# Patient Record
Sex: FEMALE | Race: AMERICAN INDIAN OR ALASKA NATIVE
[De-identification: names, ages, dates, MRNs, and addresses within clinical notes are randomized per-mention and may not be internally consistent; named-entity substitution may affect disease eponyms.]

---

## 2018-07-04 NOTE — XRAY REPORT
FINAL REPORT



PROCEDURE:  XR RIBS UNI W PA CHEST 3+V RT



TECHNIQUE:  RIGHT rib radiographs, 3 views of the ribs, including

PA chest. 



HISTORY:  Right rib pain 



COMPARISON:  No prior studies are available for comparison.



FINDINGS:  

Heart: Normal.



Mediastinum/Vessels: Normal.



Lungs: Normal.



Pleural space: Normal.



Pneumothorax: None.



Bony thorax/ribs: No significant abnormality.



IMPRESSION:  

Normal Examination.

## 2018-07-04 NOTE — XRAY REPORT
FINAL REPORT



PROCEDURE:  XR SPINE LUMBOSACRAL 2-3V



TECHNIQUE:  Lumbar spine radiographs, including AP, lateral, and

lumbosacral spot views. CPT 09021 







HISTORY:  Lower Back Pain 



COMPARISON:  No prior studies are available for comparison.



FINDINGS:  

Alignment: There is mild degree spondylolisthesis at L4-5

measuring 8 millimeters resulting in mild degree spinal canal

stenosis.



Vertebral body heights/Disk spaces: Mild narrowing of the

intervertebral disc spaces noted at L5-S1..



Fracture(s): None.



Facets: Bilateral facet arthropathy is noted from L3-4 to L5-S1.



Bone mineralization: Normal.



IMPRESSION:  

Spondylolisthesis at L4-5 with spinal canal stenosis



Degenerative disc disease at L5-S1.

## 2018-07-04 NOTE — XRAY REPORT
FINAL REPORT



PROCEDURE:  XR SHOULDER 2+V RT



TECHNIQUE:  Right shoulder radiographs including AP views in

internal and external rotation and abduction. CPT 52893







HISTORY:  Right shoulder pain 



COMPARISON:  No prior studies are available for comparison.



FINDINGS:  

Fracture (s) and/or Dislocation(s): None .



Joint space(s): Normal.



Soft tissues: Normal .



Bone mineralization: Unremarkable.



Foreign bodies: None .







IMPRESSION:  

No acute abnormality

## 2018-10-22 ENCOUNTER — HOSPITAL ENCOUNTER (OUTPATIENT)
Dept: HOSPITAL 5 - MAMMO | Age: 48
Discharge: HOME | End: 2018-10-22
Attending: NURSE PRACTITIONER
Payer: MEDICARE

## 2018-10-22 DIAGNOSIS — Z12.31: Primary | ICD-10-CM

## 2018-10-22 PROCEDURE — 77067 SCR MAMMO BI INCL CAD: CPT

## 2018-10-22 NOTE — MAMMOGRAPHY REPORT
BILATERAL DIGITAL SCREENING MAMMOGRAM with CAD : 10/22/18 08:15:00



CLINICAL: Routine screening.



COMPARISON:None available.



FINDINGS: The breasts are heterogeneously dense, which may obscure 

small masses.Bilateral benign calcifications, some which are vascular. 

No mass, architectural distortion or suspicious calcifications.



IMPRESSION: No mammographic evidence of malignancy.



BI-RADS CATEGORY:  2 -- Benign



RECOMMENDATION: Routine mammographic screening in one year.



COMMENT:

Patient follow-up letters are generated by our [employer] application.

## 2019-08-21 ENCOUNTER — HOSPITAL ENCOUNTER (EMERGENCY)
Dept: HOSPITAL 5 - ED | Age: 49
Discharge: HOME | End: 2019-08-21
Payer: MEDICARE

## 2019-08-21 VITALS — DIASTOLIC BLOOD PRESSURE: 99 MMHG | SYSTOLIC BLOOD PRESSURE: 140 MMHG
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
## 2019-08-21 DIAGNOSIS — F31.9: ICD-10-CM

## 2019-08-21 DIAGNOSIS — S46.911A: Primary | ICD-10-CM

## 2019-08-21 DIAGNOSIS — Z88.7: ICD-10-CM

## 2019-08-21 DIAGNOSIS — Z98.84: ICD-10-CM

## 2019-08-21 DIAGNOSIS — F17.200: ICD-10-CM

## 2019-08-21 DIAGNOSIS — Z79.899: ICD-10-CM

## 2019-08-21 DIAGNOSIS — S16.1XXA: ICD-10-CM
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
## 2019-08-21 PROCEDURE — 72040 X-RAY EXAM NECK SPINE 2-3 VW: CPT

## 2019-08-21 PROCEDURE — 99283 EMERGENCY DEPT VISIT LOW MDM: CPT

## 2019-08-21 NOTE — XRAY REPORT
RIGHT SHOULDER, 3 VIEWS



INDICATION:  Right shoulder pain after fall. 



COMPARISON: 7/4/2018



IMPRESSION:  No acute osseous or soft tissue abnormality.    No significant DJD. No significant mena
e since the previous exam.



Signer Name: Olievr Aponte Jr, MD 

Signed: 8/21/2019 9:21 AM

 Workstation Name: [id number]

## 2019-08-21 NOTE — XRAY REPORT
CERVICAL SPINE, 4 VIEWS



INDICATION:  Neck pain after fall on stairs.



COMPARISON: None.



IMPRESSION:  Normal alignment.  Mild degenerative disc disease is identified at C3-4, C4-5 and C5-6. 
The remaining levels and facet joints are unremarkable.  No acute osseous or soft tissue abnormality.
    



Signer Name: Oliver Aponte Jr, MD 

Signed: 8/21/2019 9:19 AM

 Workstation Name: [id number]

## 2019-08-21 NOTE — EMERGENCY DEPARTMENT REPORT
ED General Adult HPI





- General


Chief complaint: Fall


Stated complaint: FALL/NECK PAIN


Time Seen by Provider: 08/21/19 08:12


Source: patient, EMS


Mode of arrival: Stretcher


Limitations: No Limitations





- History of Present Illness


Initial comments: 





The patient presents to the emergency department with a chief complaint of right

shoulder and neck pain.  Patient states she was walking down the steps in front 

of her symptom is the way the bottom of the steps and slipped on the tile floor 

and fell onto her shoulder which in turn  stretched her neck.  Patient denies 

loss of consciousness or hitting her head.


-: Sudden


Location: neck, upper extremity


Radiation: non-radiation


Severity scale (0 -10): 8


Quality: aching


Consistency: constant


Improves with: rest


Worsens with: movement


Associated Symptoms: denies other symptoms


Treatments Prior to Arrival: none





- Related Data


                                Home Medications











 Medication  Instructions  Recorded  Confirmed  Last Taken


 


Depakote 1,500 mg PO HS 07/04/18 07/04/18 Unknown








                                  Previous Rx's











 Medication  Instructions  Recorded  Last Taken  Type


 


Acetaminophen/Codeine [Tylenol 1 tab PO Q6H PRN #15 tab 08/21/19 Unknown Rx





/Codeine # 3 tab]    


 


Ondansetron [Zofran Odt] 4 mg PO Q4HR PRN #20 tab.rapdis 08/21/19 Unknown Rx











                                    Allergies











Allergy/AdvReac Type Severity Reaction Status Date / Time


 


NSAIDS (Non-Steroidal AdvReac  Unknown Verified 07/18/18 07:24





Anti-Inflamma     














ED Review of Systems


ROS: 


Stated complaint: FALL/NECK PAIN


Other details as noted in HPI





Comment: All other systems reviewed and negative


Constitutional: denies: chills, fever


Eyes: denies: eye pain, eye discharge, vision change


ENT: denies: ear pain, throat pain


Respiratory: denies: cough, shortness of breath, wheezing


Cardiovascular: denies: chest pain, palpitations


Endocrine: no symptoms reported


Gastrointestinal: denies: abdominal pain, nausea, diarrhea


Genitourinary: denies: urgency, dysuria, discharge


Musculoskeletal: denies: back pain, joint swelling, arthralgia


Skin: denies: rash, lesions


Neurological: denies: headache, weakness, paresthesias


Psychiatric: denies: anxiety, depression


Hematological/Lymphatic: denies: easy bleeding, easy bruising





ED Past Medical Hx





- Past Medical History


Hx Psychiatric Treatment: Yes (Bipolar)





- Surgical History


Additional Surgical History: Bariactric Surgery 2015





- Social History


Smoking Status: Current Every Day Smoker





- Medications


Home Medications: 


                                Home Medications











 Medication  Instructions  Recorded  Confirmed  Last Taken  Type


 


Depakote 1,500 mg PO HS 07/04/18 07/04/18 Unknown History


 


Acetaminophen/Codeine [Tylenol 1 tab PO Q6H PRN #15 tab 08/21/19  Unknown Rx





/Codeine # 3 tab]     


 


Ondansetron [Zofran Odt] 4 mg PO Q4HR PRN #20 tab.rapdis 08/21/19  Unknown Rx














ED Physical Exam





- General


Limitations: No Limitations


General appearance: alert, in no apparent distress





- Head


Head exam: Present: atraumatic, normocephalic





- Eye


Eye exam: Present: normal appearance, PERRL, EOMI





- ENT


ENT exam: Present: mucous membranes moist





- Neck


Neck exam: Present: other (paracervical ttp no midline ttp)





- Respiratory


Respiratory exam: Present: normal lung sounds bilaterally.  Absent: respiratory 

distress





- Cardiovascular


Cardiovascular Exam: Present: regular rate, normal rhythm.  Absent: systolic 

murmur, diastolic murmur, rubs, gallop





- GI/Abdominal


GI/Abdominal exam: Present: soft, normal bowel sounds.  Absent: distended, 

tenderness





- Extremities Exam


Extremities exam: Present: other (right shoulder is tender to palpation at the 

acromioclavicular junction with limited range of motion secondary to pain)





- Back Exam


Back exam: Present: normal inspection





- Neurological Exam


Neurological exam: Present: alert, oriented X3, CN II-XII intact.  Absent: motor

sensory deficit





- Psychiatric


Psychiatric exam: Present: normal affect, normal mood





- Skin


Skin exam: Present: warm, dry, intact, normal color.  Absent: rash





ED Course


                                   Vital Signs











  08/21/19 08/21/19 08/21/19





  07:58 08:14 08:16


 


Temperature   98.6 F


 


Pulse Rate 92 H  66


 


Respiratory 16 18 16





Rate   


 


Blood Pressure 160/70  131/95





[Left]   


 


O2 Sat by Pulse 98 97 97





Oximetry   














ED Medical Decision Making





- Radiology Data


Radiology results: report reviewed





- Medical Decision Making





Discussed results with patient 


Critical care attestation.: 


If time is entered above; I have spent that time in minutes in the direct care 

of this critically ill patient, excluding procedure time.








ED Disposition


Clinical Impression: 


 Right shoulder strain, Cervical muscle strain, Cervical arthritis





Disposition: DC-01 TO HOME OR SELFCARE


Is pt being admited?: No


Does the pt Need Aspirin: No


Condition: Stable


Instructions:  Muscle Strain (ED), Cervical Spine Strain (ED), Shoulder Sprain 

(ED)


Additional Instructions: 


return if worse


Referrals: 


[facility],MD [Primary Care Provider] - 3-5 Days


[facility] [Provider Group] - 3-5 Days


[facility] [Provider Group] - 3-5 Days


Time of Disposition: 09:36

## 2019-09-28 ENCOUNTER — HOSPITAL ENCOUNTER (EMERGENCY)
Dept: HOSPITAL 5 - ED | Age: 49
Discharge: HOME | End: 2019-09-28
Payer: MEDICARE

## 2019-09-28 VITALS — DIASTOLIC BLOOD PRESSURE: 78 MMHG | SYSTOLIC BLOOD PRESSURE: 145 MMHG
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
## 2019-09-28 DIAGNOSIS — Z88.8: ICD-10-CM
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
## 2019-09-28 DIAGNOSIS — F17.200: ICD-10-CM
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
## 2019-09-28 DIAGNOSIS — S83.92XA: Primary | ICD-10-CM

## 2019-09-28 DIAGNOSIS — M13.862: ICD-10-CM

## 2019-09-28 NOTE — EMERGENCY DEPARTMENT REPORT
ED Lower Extremity HPI





- General


Chief Complaint: Extremity Injury, Lower


Stated Complaint: LEFT KNEE PAIN


Time Seen by Provider: 09/28/19 03:06


Source: patient


Mode of arrival: Ambulatory


Limitations: No Limitations





- History of Present Illness


Initial Comments: 





Patient is a 49-year-old female who presents to emergency room with complaints 

of left knee pain began around 8 PM last night.  Patient states that she hit her

knee against the cab door when getting inside.  She states she has not been 

ambulatory.  Patient has never hurt this knee before.  He denies any numbness or

weakness or any other injury.  Patient denies any past medical history.  Patient

states that she had bariatric surgery and was advised not to use NSAIDs 

regularly. 





- Related Data


                                  Previous Rx's











 Medication  Instructions  Recorded  Last Taken  Type


 


Acetaminophen/Codeine [Tylenol 1 tab PO Q6H PRN #7 tab 09/28/19 Unknown Rx





/Codeine # 3 tab]    


 


Meloxicam [Mobic] 7.5 mg PO QDAY PRN #7 tablet 09/28/19 Unknown Rx











                                    Allergies











Allergy/AdvReac Type Severity Reaction Status Date / Time


 


NSAIDS (Non-Steroidal Allergy  Unknown Verified 09/28/19 02:33





Anti-Inflamma     














ED Review of Systems


ROS: 


Stated complaint: LEFT KNEE PAIN


Other details as noted in HPI





Comment: All other systems reviewed and negative





ED Past Medical Hx





- Past Medical History


Previous Medical History?: No





- Surgical History


Past Surgical History?: Yes


Additional Surgical History: barriatric X 4 years





- Social History


Smoking Status: Current Every Day Smoker


Substance Use Type: None





- Medications


Home Medications: 


                                Home Medications











 Medication  Instructions  Recorded  Confirmed  Last Taken  Type


 


Acetaminophen/Codeine [Tylenol 1 tab PO Q6H PRN #7 tab 09/28/19  Unknown Rx





/Codeine # 3 tab]     


 


Meloxicam [Mobic] 7.5 mg PO QDAY PRN #7 tablet 09/28/19  Unknown Rx














ED Physical Exam





- General


Limitations: No Limitations


General appearance: alert, in no apparent distress





- Head


Head exam: Present: atraumatic, normocephalic





- Eye


Eye exam: Present: normal appearance





- ENT


ENT exam: Present: mucous membranes moist





- Extremities Exam


Extremities exam: Present: other (TTP of the left anterior knee with edema 

present, pt will not perform ROM secondary to pain, pt will not allow assessment

of ligaments, 2+ distal pulses, sensation intact)





- Neurological Exam


Neurological exam: Present: alert, oriented X3





- Psychiatric


Psychiatric exam: Present: normal affect, normal mood





- Skin


Skin exam: Present: warm, dry, intact





ED Course


                                   Vital Signs











  09/28/19 09/28/19





  02:38 05:22


 


Temperature 98.2 F 


 


Pulse Rate 114 H 90


 


Respiratory 20 16





Rate  


 


Blood Pressure 143/91 


 


Blood Pressure  145/78





[Right]  


 


O2 Sat by Pulse 97 99





Oximetry  














ED Lower Extremity MDM





- Radiology Data


Radiology results: report reviewed





LEFT KNEE 3 VIEW(S)





INDICATION / CLINICAL INFORMATION:


left knee pain





COMPARISON:


None available.





FINDINGS:





BONES / JOINT(S): No acute fracture or subluxation. Moderately advanced 

tricompartment degenerative


arthrosis. Small moderate left knee joint effusion.





SOFT TISSUES: Mild to moderate circumferential soft tissue swelling.





ADDITIONAL FINDINGS: None.











Signer Name: CHARU Bianchi MD


Signed: 9/28/2019 4:10 AM


Workstation Name: [id number]








Transcribed By: DT


Dictated By: Parrish Bianchi MD


Electronically Authenticated By: Parrish Bianchi MD


Signed Date/Time: 09/28/19 0410





- Medical Decision Making





Patient is a 49-year-old female who presents to emergency room with complaints 

of left knee pain began around 8 PM last night.  Patient states that she hit her

 knee against the cab door when getting inside.  She states she has not been 

ambulatory.  Patient has never hurt this knee before.  He denies any numbness or

 weakness or any other injury.  Patient denies any past medical history.  

Patient states that she had bariatric surgery and was advised not to use NSAIDs 

regularly. initial vitals with elevated HR which improved after pain medication 

administration. on exam: TTP of the left anterior knee with edema present, pt 

will not perform ROM secondary to pain, pt will not allow assessment of 

ligaments, 2+ distal pulses, sensation intact. XR of the left knee: No acute 

fracture or subluxation. Moderately advanced tricompartment degenerative 

arthrosis. Small moderate left knee joint effusion.SOFT TISSUES: Mild to 

moderate circumferential soft tissue swelling. due to inability to fully assess 

pts ROM and ligament stability pt was placed in knee immobilizer and given 

crutches and crutch training and discussed to see an orthopedic in the next 2-3 

days for full examination. advised pt to please take medication as prescribed.  

Do not drive or operate heavy machinery while taking the medication.  may use 

ice, elevation of the leg, rest. please follow up with Dr. Liu, orthopedic in

 the next 2-3 days. Return to the emergency room for any new or worsening 

symptoms.





- Differential Diagnosis


strain, sprain, fx, dislocation, tendon/ligament injury


Critical care attestation.: 


If time is entered above; I have spent that time in minutes in the direct care 

of this critically ill patient, excluding procedure time.








ED Disposition


Clinical Impression: 


 Arthritis, Knee effusion, left





Left knee pain


Qualifiers:


 Chronicity: acute Qualified Code(s): M25.562 - Pain in left knee





Left knee sprain


Qualifiers:


 Encounter type: initial encounter Involved ligament of knee: unspecified 

ligament Qualified Code(s): S83.92XA - Sprain of unspecified site of left knee, 

initial encounter





Disposition: DC-01 TO HOME OR SELFCARE


Is pt being admited?: No


Does the pt Need Aspirin: No


Condition: Stable


Instructions:  Knee Sprain (ED), Knee Effusion (ED), RICE Therapy (ED)


Additional Instructions: 


Please take medication as prescribed.  Do not drive or operate heavy machinery 

while taking the medication.  may use ice, elevation of the leg, rest. please 

follow up with Dr. Liu, orthopedic in the next 2-3 days. Return to the 

emergency room for any new or worsening symptoms.


Prescriptions: 


Meloxicam [Mobic] 7.5 mg PO QDAY PRN #7 tablet


 PRN Reason: Pain, Moderate (4-6)


Acetaminophen/Codeine [Tylenol /Codeine # 3 tab] 1 tab PO Q6H PRN #7 tab


 PRN Reason: Pain , Severe (7-10)


Referrals: 


CHELSEA LIU MD [Staff Physician] - 3-5 Days


Time of Disposition: 04:25


Print Language: ENGLISH

## 2019-09-28 NOTE — XRAY REPORT
LEFT KNEE 3 VIEW(S)



INDICATION / CLINICAL INFORMATION:

left knee pain



COMPARISON:

None available.

 

FINDINGS:



BONES / JOINT(S): No acute fracture or subluxation. Moderately advanced tricompartment degenerative a
rthrosis. Small moderate left knee joint effusion.



SOFT TISSUES: Mild to moderate circumferential soft tissue swelling.



ADDITIONAL FINDINGS: None.







Signer Name: CHARU Bianchi MD 

Signed: 9/28/2019 4:10 AM

 Workstation Name: [id number]

## 2019-10-05 ENCOUNTER — HOSPITAL ENCOUNTER (EMERGENCY)
Dept: HOSPITAL 5 - ED | Age: 49
Discharge: HOME | End: 2019-10-05
Payer: MEDICARE

## 2019-10-05 VITALS — SYSTOLIC BLOOD PRESSURE: 130 MMHG | DIASTOLIC BLOOD PRESSURE: 97 MMHG
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
## 2019-10-05 DIAGNOSIS — S83.92XD: Primary | ICD-10-CM

## 2019-10-05 DIAGNOSIS — F17.200: ICD-10-CM

## 2019-10-05 DIAGNOSIS — F31.9: ICD-10-CM

## 2019-10-05 PROCEDURE — 99281 EMR DPT VST MAYX REQ PHY/QHP: CPT

## 2019-10-05 NOTE — EVENT NOTE
ED Screening Note


Date of service: 10/05/19


Time: 13:29


ED Screening Note: 


Seen here last friday night and injured left . Came and had xray and was told 

she had sprained knee and brace placed. She called Noe and was told that he 

does not take well care. She finally found [place] [facility] on 

10/14/2019 at 10 pm at [facility]. She was given tylenol 3 and another 

pill with crutches





Still with brace to knee and using crutches. Pain 7/10





This initial assessment/diagnostic orders/clinical plan/treatment(s) is/are 

subject to change based on patients health status, clinical progression and re-

assessment by fellow clinical providers in the ED. Further treatment and workup 

at subsequent clinical providers discretion. Patient/guardian urged not to elope

from the ED as their condition may be serious if not clinically assessed and 

managed. 





Initial orders include: TBS by FT provider

## 2019-10-05 NOTE — EMERGENCY DEPARTMENT REPORT
ED Lower Extremity HPI





- General


Chief Complaint: Extremity Injury, Lower


Stated Complaint: LFT KNEE PAIN


Time Seen by Provider: 10/05/19 13:24


Source: patient


Mode of arrival: Ambulatory


Limitations: No Limitations





- History of Present Illness


Initial Comments: 





Recent dx knee sprain


not to see ortho until next week and needs more pain meds


MD Complaint: knee injury


-: Sudden, week(s) (1)


Injury: Knee: Left


Type of Injury: unknown


Place: home


Improves With: rest


Worsens With: movement





- Related Data


                                Home Medications











 Medication  Instructions  Recorded  Confirmed  Last Taken


 


Depakote 1,500 mg PO HS 07/04/18 07/04/18 Unknown








                                  Previous Rx's











 Medication  Instructions  Recorded  Last Taken  Type


 


Ondansetron [Zofran Odt] 4 mg PO Q4HR PRN #20 tab.rapdis 08/21/19 Unknown Rx


 


Acetaminophen/Codeine [Tylenol 1 tab PO Q6H PRN #12 tab 10/05/19 Unknown Rx





/Codeine # 3 tab]    











                                    Allergies











Allergy/AdvReac Type Severity Reaction Status Date / Time


 


NSAIDS (Non-Steroidal AdvReac  Unknown Verified 07/18/18 07:24





Anti-Inflamma     














ED Review of Systems


ROS: 


Stated complaint: LFT KNEE PAIN


Other details as noted in HPI





Comment: All other systems reviewed and negative





ED Past Medical Hx





- Past Medical History


Hx Psychiatric Treatment: Yes (Bipolar)





- Surgical History


Past Surgical History?: No


Additional Surgical History: Bariactric Surgery 2015





- Social History


Smoking Status: Current Every Day Smoker


Substance Use Type: Alcohol





- Medications


Home Medications: 


                                Home Medications











 Medication  Instructions  Recorded  Confirmed  Last Taken  Type


 


Depakote 1,500 mg PO HS 07/04/18 07/04/18 Unknown History


 


Ondansetron [Zofran Odt] 4 mg PO Q4HR PRN #20 tab.rapdis 08/21/19  Unknown Rx


 


Acetaminophen/Codeine [Tylenol 1 tab PO Q6H PRN #12 tab 10/05/19  Unknown Rx





/Codeine # 3 tab]     














ED Physical Exam





- General


Limitations: No Limitations


General appearance: alert, in no apparent distress





- Head


Head exam: Present: atraumatic, normocephalic





- Eye


Eye exam: Present: normal appearance, EOMI





- Neck


Neck exam: Present: normal inspection, full ROM





- Extremities Exam


Extremities exam: Present: other (immobilizer to L knee, mild swelling to knee, 

no calf pain or edema, normal pulses distally )





- Neurological Exam


Neurological exam: Present: alert, oriented X3





- Psychiatric


Psychiatric exam: Present: normal affect, normal mood





- Skin


Skin exam: Present: warm, dry, normal color





ED Course





                                   Vital Signs











  10/05/19





  13:20


 


Temperature 98.1 F


 


Pulse Rate 88


 


Respiratory 18





Rate 


 


Blood Pressure 130/97





[Left] 


 


O2 Sat by Pulse 99





Oximetry 














ED Lower Extremity MDM





- Medical Decision Making





will give few pain pills until f/u





- Differential Diagnosis


sprain, med refill


Critical care attestation.: 


If time is entered above; I have spent that time in minutes in the direct care 

of this critically ill patient, excluding procedure time.








ED Disposition


Clinical Impression: 


Left knee sprain


Qualifiers:


 Encounter type: subsequent encounter Involved ligament of knee: unspecified 

ligament Qualified Code(s): S83.92XD - Sprain of unspecified site of left knee, 

subsequent encounter





Disposition: DC-01 TO HOME OR SELFCARE


Is pt being admited?: No


Condition: Good


Instructions:  Knee Sprain (ED)


Additional Instructions: 


Keep your orthopedic follow up. 


Prescriptions: 


Acetaminophen/Codeine [Tylenol /Codeine # 3 tab] 1 tab PO Q6H PRN #12 tab


 PRN Reason: pain


Time of Disposition: 14:56

## 2019-10-25 ENCOUNTER — HOSPITAL ENCOUNTER (EMERGENCY)
Dept: HOSPITAL 5 - ED | Age: 49
Discharge: HOME | End: 2019-10-25
Payer: MEDICARE

## 2019-10-25 VITALS — SYSTOLIC BLOOD PRESSURE: 115 MMHG | DIASTOLIC BLOOD PRESSURE: 76 MMHG

## 2019-10-25 DIAGNOSIS — F31.9: ICD-10-CM

## 2019-10-25 DIAGNOSIS — M54.16: Primary | ICD-10-CM

## 2019-10-25 DIAGNOSIS — F17.200: ICD-10-CM

## 2019-10-25 DIAGNOSIS — Z79.899: ICD-10-CM

## 2019-10-25 DIAGNOSIS — M25.562: ICD-10-CM

## 2019-10-25 DIAGNOSIS — Z88.7: ICD-10-CM

## 2019-10-25 PROCEDURE — 96372 THER/PROPH/DIAG INJ SC/IM: CPT

## 2019-10-25 PROCEDURE — 99282 EMERGENCY DEPT VISIT SF MDM: CPT

## 2019-10-25 NOTE — EMERGENCY DEPARTMENT REPORT
ED Extremity Problem HPI





- General


Chief complaint: Extremity Injury, Lower


Stated complaint: LFT KNEE SPRAINED/RT LEG PAIN


Time Seen by Provider: 10/25/19 08:14


Source: patient


Mode of arrival: Ambulatory


Limitations: No Limitations





- History of Present Illness


Initial comments: 





Patient is a 49-year-old American female in no snake and past mental history 

states that approximately 3 weeks ago she tripped and sprained her left knee.  

Left knee is improving however over the last week she is developed some right 

lower back pain is radiating into the right buttock and down through the right 

lower extremity.  The patient denies any bowel or bladder dysfunction.  She 

denies any new direct trauma.  Patient is having difficulty walking secondary to

pain.  Pain is 8 out of 10 in severity is just uncomfortable feeling.  Patient 

states she cannot sit up straight because of the pain.


Associated Symptoms: denies: chest pain, shortness of breath, fever, myalgias, 

arthralgias





- Related Data


                                Home Medications











 Medication  Instructions  Recorded  Confirmed  Last Taken


 


Depakote 1,500 mg PO HS 07/04/18 07/04/18 Unknown








                                  Previous Rx's











 Medication  Instructions  Recorded  Last Taken  Type


 


Ondansetron [Zofran Odt] 4 mg PO Q4HR PRN #20 tab.rapdis 08/21/19 Unknown Rx


 


Acetaminophen/Codeine [Tylenol 1 tab PO Q6H PRN #12 tab 10/05/19 Unknown Rx





/Codeine # 3 tab]    


 


methOCARBAMOL [Robaxin TAB] 500 mg PO Q6H PRN #14 tablet 10/25/19 Unknown Rx


 


traMADol [Ultram] 50 mg PO Q6HR PRN #12 tablet 10/25/19 Unknown Rx











                                    Allergies











Allergy/AdvReac Type Severity Reaction Status Date / Time


 


NSAIDS (Non-Steroidal AdvReac  Unknown Verified 07/18/18 07:24





Anti-Inflamma     














ED Review of Systems


ROS: 


Stated complaint: LFT KNEE SPRAINED/RT LEG PAIN


Other details as noted in HPI





Comment: All other systems reviewed and negative





ED Past Medical Hx





- Past Medical History


Previous Medical History?: Yes


Hx Psychiatric Treatment: Yes (Bipolar)





- Surgical History


Past Surgical History?: Yes


Additional Surgical History: Bariactric Surgery 2015





- Social History


Smoking Status: Current Every Day Smoker


Substance Use Type: None





- Medications


Home Medications: 


                                Home Medications











 Medication  Instructions  Recorded  Confirmed  Last Taken  Type


 


Depakote 1,500 mg PO HS 07/04/18 07/04/18 Unknown History


 


Ondansetron [Zofran Odt] 4 mg PO Q4HR PRN #20 tab.rapdis 08/21/19  Unknown Rx


 


Acetaminophen/Codeine [Tylenol 1 tab PO Q6H PRN #12 tab 10/05/19  Unknown Rx





/Codeine # 3 tab]     


 


methOCARBAMOL [Robaxin TAB] 500 mg PO Q6H PRN #14 tablet 10/25/19  Unknown Rx


 


traMADol [Ultram] 50 mg PO Q6HR PRN #12 tablet 10/25/19  Unknown Rx














ED Physical Exam





- General


Limitations: No Limitations


General appearance: alert, in no apparent distress





- Head


Head exam: Present: atraumatic, normocephalic





- Eye


Eye exam: Present: normal appearance, PERRL, EOMI





- ENT


ENT exam: Present: mucous membranes moist





- Neck


Neck exam: Present: normal inspection





- Respiratory


Respiratory exam: Present: normal lung sounds bilaterally.  Absent: respiratory 

distress, wheezes, rales, rhonchi





- Cardiovascular


Cardiovascular Exam: Present: regular rate, normal rhythm, normal heart sounds





- GI/Abdominal


GI/Abdominal exam: Present: soft, normal bowel sounds.  Absent: distended





- Extremities Exam


Extremities exam: Present: normal inspection





- Back Exam


Back exam: Present: normal inspection, paraspinal tenderness (right lumbar. pain

 with palpation of buttock)





- Neurological Exam


Neurological exam: Present: alert, oriented X3





- Psychiatric


Psychiatric exam: Present: normal affect, normal mood





- Skin


Skin exam: Present: warm, dry, intact, normal color.  Absent: rash





ED Course





                                   Vital Signs











  10/25/19





  08:06


 


Temperature 98.6 F


 


Pulse Rate 92 H


 


Respiratory 18





Rate 


 


Blood Pressure 115/76


 


O2 Sat by Pulse 98





Oximetry 














ED Medical Decision Making





- Medical Decision Making





Patient's pain is consistent with acute lumbar radiculopathy.  Patient like was 

favoring her left knee has developed issue on her right lower back.  Patient be 

referred to orthopedic surgery the patient be started on steroid therapy and 

pain management.


Critical care attestation.: 


If time is entered above; I have spent that time in minutes in the direct care 

of this critically ill patient, excluding procedure time.








ED Disposition


Clinical Impression: 


 Lumbar radiculopathy, acute





Disposition: DC-01 TO HOME OR SELFCARE


Is pt being admited?: No


Does the pt Need Aspirin: No


Condition: Stable


Instructions:  Lumbar Radiculopathy (ED)


Referrals: 


CHELSEA SAEZ MD [Staff Physician] - 3-5 Days


Time of Disposition: 08:21

## 2020-02-18 ENCOUNTER — HOSPITAL ENCOUNTER (OUTPATIENT)
Dept: HOSPITAL 5 - MAMMO | Age: 50
Discharge: HOME | End: 2020-02-18
Attending: INTERNAL MEDICINE
Payer: MEDICARE

## 2020-02-18 DIAGNOSIS — Z12.31: Primary | ICD-10-CM

## 2020-02-18 PROCEDURE — 77067 SCR MAMMO BI INCL CAD: CPT

## 2020-02-18 NOTE — MAMMOGRAPHY REPORT
DIGITAL SCREENING MAMMOGRAM WITH CAD, 2/18/2020



INDICATION: Routine screening mammography. 



TECHNIQUE:  Digital bilateral  2D mammography was obtained in the craniocaudal and mediolateral obliq
ue projections. This examination was interpreted with the benefit of Computer-Aided Detection analysi
s.



COMPARISON: 10/22/2018



FINDINGS: 



Breast Density: The breasts are heterogeneously dense, which may obscure small masses.



Left asymmetries on both views require additional imaging. No architectural distortion or suspicious 
calcifications. There is no evidence of dominant mass, suspicious calcifications or architectural dis
tortion in the right breast.



IMPRESSION: Left asymmetries requiring additional imaging. Recommend recall for left lateral, repeat 
MLO and spot compression MLO and CC views and left breast ultrasound if needed.



Follow up recommendation: Special View: Spot



Category 0: Incomplete. Needs additional imaging evaluation and/or prior mammograms for comparison.



A "normal" or negative report should not discourage follow up or biopsy of a clinically significant f
inding.



A written summary of these findings will be mailed to the patient. The patient will be entered into a
 mammography reporting system which will generate a reminder letter for the patient's next appointmen
t at the appropriate interval.



The [employer] recommends yearly mammograms starting at age 40 and continuing as l
cristhian as a woman is in good health.  Breast MRI is recommended for women with an approximate 20-25% or 
greater lifetime risk of breast cancer, including women with a strong family history of breast or ova
lisseth cancer or who have been treated for Hodgkin's disease.



Signer Name: Jesus Ralph MD 

Signed: 2/18/2020 3:12 PM

 Workstation Name: [id number]

## 2020-03-03 ENCOUNTER — HOSPITAL ENCOUNTER (EMERGENCY)
Dept: HOSPITAL 5 - ED | Age: 50
LOS: 1 days | Discharge: HOME | End: 2020-03-04
Payer: MEDICARE

## 2020-03-03 DIAGNOSIS — Z79.899: ICD-10-CM

## 2020-03-03 DIAGNOSIS — S29.012A: Primary | ICD-10-CM

## 2020-03-03 DIAGNOSIS — F31.9: ICD-10-CM

## 2020-03-03 DIAGNOSIS — F17.200: ICD-10-CM
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
## 2020-03-03 DIAGNOSIS — J18.9: ICD-10-CM

## 2020-03-03 DIAGNOSIS — Z98.890: ICD-10-CM
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
## 2020-03-03 DIAGNOSIS — Z88.6: ICD-10-CM
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
## 2020-03-03 LAB
ALBUMIN SERPL-MCNC: 3.3 G/DL (ref 3.9–5)
ALT SERPL-CCNC: 7 UNITS/L (ref 7–56)
APTT BLD: 32.8 SEC. (ref 24.2–36.6)
BUN SERPL-MCNC: 6 MG/DL (ref 7–17)
BUN/CREAT SERPL: 15 %
CALCIUM SERPL-MCNC: 8.6 MG/DL (ref 8.4–10.2)
HCT VFR BLD CALC: 34.6 % (ref 30.3–42.9)
HEMOLYSIS INDEX: 2
HGB BLD-MCNC: 11.8 GM/DL (ref 10.1–14.3)
INR PPP: 0.89 (ref 0.87–1.13)
MCHC RBC AUTO-ENTMCNC: 34 % (ref 30–34)
MCV RBC AUTO: 99 FL (ref 79–97)
PLATELET # BLD: 442 K/MM3 (ref 140–440)
RBC # BLD AUTO: 3.49 M/MM3 (ref 3.65–5.03)

## 2020-03-03 PROCEDURE — 96365 THER/PROPH/DIAG IV INF INIT: CPT

## 2020-03-03 PROCEDURE — 96375 TX/PRO/DX INJ NEW DRUG ADDON: CPT

## 2020-03-03 PROCEDURE — 85025 COMPLETE CBC W/AUTO DIFF WBC: CPT

## 2020-03-03 PROCEDURE — 36415 COLL VENOUS BLD VENIPUNCTURE: CPT

## 2020-03-03 PROCEDURE — 80053 COMPREHEN METABOLIC PANEL: CPT

## 2020-03-03 PROCEDURE — 93010 ELECTROCARDIOGRAM REPORT: CPT

## 2020-03-03 PROCEDURE — 93005 ELECTROCARDIOGRAM TRACING: CPT

## 2020-03-03 PROCEDURE — 85610 PROTHROMBIN TIME: CPT

## 2020-03-03 PROCEDURE — 85007 BL SMEAR W/DIFF WBC COUNT: CPT

## 2020-03-03 PROCEDURE — 71046 X-RAY EXAM CHEST 2 VIEWS: CPT

## 2020-03-03 PROCEDURE — 99285 EMERGENCY DEPT VISIT HI MDM: CPT

## 2020-03-03 PROCEDURE — 85730 THROMBOPLASTIN TIME PARTIAL: CPT

## 2020-03-03 PROCEDURE — 84484 ASSAY OF TROPONIN QUANT: CPT

## 2020-03-03 NOTE — EMERGENCY DEPARTMENT REPORT
Blank Doc





- Documentation


Documentation: 





50-year-old female that presents with chest pain, SOB and cough. 





This initial assessment/diagnostic orders/clinical plan/treatment(s) is/are 

subject to change based on patient's health status, clinical progression and re-

assessment by fellow clinical providers in the ED.  Further treatment and workup

at subsequent clinical providers discretion.  Patient/guardians urged not to 

elope from the ED as their condition may be serious if not clinically assessed 

and managed.  Initial orders include:


1- Patient sent to [facility] for further evaluation and treatment


2- cardiac work-up

## 2020-03-03 NOTE — XRAY REPORT
CHEST 2 VIEWS 



INDICATION / CLINICAL INFORMATION:

Chest Pain.



COMPARISON: 

None available.



FINDINGS:



SUPPORT DEVICES: None.

HEART / MEDIASTINUM: No significant abnormality. 

LUNGS / PLEURA: There is mild airspace opacity in the left lung base which could represent atelectasi
s or pneumonia. The remainder the lungs are clear. .No pneumothorax. 



ADDITIONAL FINDINGS: No significant additional findings.



IMPRESSION:

1. There is mild airspace opacity in the left base which could represent atelectasis or pneumonia.



Signer Name: Nick Aguayo MD 

Signed: 3/3/2020 10:35 PM

Workstation Name: [id number]

## 2020-03-03 NOTE — EMERGENCY DEPARTMENT REPORT
ED General Adult HPI





- General


Chief complaint: Chest Pain


Stated complaint: CHEST/BACK PAIN


Time Seen by Provider: 03/03/20 21:48


Source: patient, EMS


Mode of arrival: Wheelchair


Limitations: No Limitations





- History of Present Illness


Initial comments: 





Patient is a 50-year-old female that presents emergency room with complaints of 

chest pain, back pain and cough.  Patient states her cough is been going on for 

a week.  Patient states that her chest pain and back pain have been going on for

1 day.  Patient complains of bilateral chest pain.  Patient states the chest 

pain is better with rest and worse with coughing and movement.  Patient states 

the chest pain is a 9 out of 10.  Patient states she is also having upper back 

pain.  Patient states it is in both sides of her back.  Patient states the chest

pain is not radiating and they are separate.  Patient states that the back pain 

is a 9 out of 10.  Patient states the back pain is better with rest and worse 

with coughing and movement.  Patient states her chest and back pain also worse 

with palpation.  Patient also complains of shortness of breath with coughing.  

Patient denies past medical history except for bipolar disorder.  Patient denies

nausea vomiting.  Patient denies fever and chills.


-: Sudden


Location: chest, back


Radiation: non-radiation


Severity scale (0 -10): 9


Consistency: constant


Improves with: rest


Worsens with: movement


Associated Symptoms: chest pain, cough, shortness of breath.  denies: confusion,

diaphoresis, fever/chills, headaches, loss of appetite, malaise, n

ausea/vomiting, rash, seizure, syncope, weakness


Treatments Prior to Arrival: none





- Related Data


                                  Previous Rx's











 Medication  Instructions  Recorded  Last Taken  Type


 


Ondansetron [Zofran Odt] 4 mg PO Q4HR PRN #20 tab.rapdis 08/21/19 Unknown Rx


 


Acetaminophen/Codeine [Tylenol 1 tab PO Q6H PRN #12 tab 10/05/19 Unknown Rx





/Codeine # 3 tab]    


 


methOCARBAMOL [Robaxin TAB] 500 mg PO Q6H PRN #14 tablet 10/25/19 Unknown Rx


 


traMADoL [Ultram] 50 mg PO Q6HR PRN #12 tablet 10/25/19 Unknown Rx


 


Buspirone HCl [busPIRone] 20 mg PO DAILY 30 Days #30 03/04/20 Unknown Rx


 


DOXYCYCLINE Hyclate [Vibramycin 100 mg PO Q12HR 10 Days #20 capsule 03/04/20 

Unknown Rx





CAP]    


 


Depakote 1,500 mg PO HS 30 Days #30 03/04/20 Unknown Rx


 


guaiFENesin/CODEINE [Robitussin AC] 5 ml PO Q4HR PRN #100 ml 03/04/20 Unknown Rx


 


methylPREDNISolone [Medrol 4MG 4 mg PO DAILY 6 Days #1 tab.ds.pk 03/04/20 

Unknown Rx





DOSEPAK (21 tabs)]    











                                    Allergies











Allergy/AdvReac Type Severity Reaction Status Date / Time


 


NSAIDS (Non-Steroidal AdvReac  Unknown Verified 07/18/18 07:24





Anti-Inflamma     














ED Review of Systems


ROS: 


Stated complaint: CHEST/BACK PAIN


Other details as noted in HPI





Constitutional: denies: chills, fever


Eyes: denies: eye pain, eye discharge, vision change


ENT: denies: ear pain, throat pain


Respiratory: cough, shortness of breath, SOB with exertion.  denies: SOB at 

rest, wheezing


Cardiovascular: chest pain.  denies: palpitations


Endocrine: no symptoms reported


Gastrointestinal: denies: abdominal pain, nausea, diarrhea


Genitourinary: denies: urgency, dysuria, discharge


Musculoskeletal: back pain.  denies: joint swelling, arthralgia


Skin: denies: rash, lesions


Neurological: denies: headache, weakness, paresthesias


Psychiatric: denies: anxiety, depression


Hematological/Lymphatic: denies: easy bleeding, easy bruising





ED Past Medical Hx





- Past Medical History


Previous Medical History?: Yes


Hx Psychiatric Treatment: Yes (Bipolar)





- Surgical History


Past Surgical History?: Yes


Additional Surgical History: Bariatric Surgery 2015





- Family History


Family history: no significant





- Social History


Smoking Status: Current Every Day Smoker


Substance Use Type: None





- Medications


Home Medications: 


                                Home Medications











 Medication  Instructions  Recorded  Confirmed  Last Taken  Type


 


Ondansetron [Zofran Odt] 4 mg PO Q4HR PRN #20 tab.rapdis 08/21/19 03/04/20 

Unknown Rx


 


Acetaminophen/Codeine [Tylenol 1 tab PO Q6H PRN #12 tab 10/05/19 03/04/20 

Unknown Rx





/Codeine # 3 tab]     


 


methOCARBAMOL [Robaxin TAB] 500 mg PO Q6H PRN #14 tablet 10/25/19 03/04/20 

Unknown Rx


 


traMADoL [Ultram] 50 mg PO Q6HR PRN #12 tablet 10/25/19 03/04/20 Unknown Rx


 


Buspirone HCl [busPIRone] 20 mg PO DAILY 30 Days #30 03/04/20  Unknown Rx


 


DOXYCYCLINE Hyclate [Vibramycin 100 mg PO Q12HR 10 Days #20 capsule 03/04/20  

Unknown Rx





CAP]     


 


Depakote 1,500 mg PO HS 30 Days #30 03/04/20  Unknown Rx


 


guaiFENesin/CODEINE [Robitussin AC] 5 ml PO Q4HR PRN #100 ml 03/04/20  Unknown 

Rx


 


methylPREDNISolone [Medrol 4MG 4 mg PO DAILY 6 Days #1 tab.ds.pk 03/04/20  

Unknown Rx





DOSEPAK (21 tabs)]     














ED Physical Exam





- General


Limitations: No Limitations


General appearance: alert, in no apparent distress





- Head


Head exam: Present: atraumatic, normocephalic





- Eye


Eye exam: Present: normal appearance





- ENT


ENT exam: Present: mucous membranes moist





- Neck


Neck exam: Present: normal inspection





- Respiratory


Respiratory exam: Present: normal lung sounds bilaterally, chest wall 

tenderness.  Absent: respiratory distress, wheezes, rales, rhonchi, accessory 

muscle use





- Cardiovascular


Cardiovascular Exam: Present: regular rate, normal rhythm.  Absent: systolic 

murmur, diastolic murmur, rubs, gallop





- GI/Abdominal


GI/Abdominal exam: Present: soft, normal bowel sounds.  Absent: distended, 

tenderness, guarding





- Rectal


Rectal exam: Present: deferred





- Extremities Exam


Extremities exam: Present: normal inspection





- Back Exam


Back exam: Present: normal inspection





- Neurological Exam


Neurological exam: Present: alert, oriented X3





- Psychiatric


Psychiatric exam: Present: normal affect, normal mood





- Skin


Skin exam: Present: warm, dry, intact, normal color.  Absent: rash





ED Course


                                   Vital Signs











  03/03/20 03/04/20





  21:17 00:02


 


Temperature 98.1 F 99.3 F


 


Pulse Rate 107 H 101 H


 


Respiratory 18 16





Rate  


 


Blood Pressure 92/62 


 


Blood Pressure  106/78





[Right]  


 


O2 Sat by Pulse 96 100





Oximetry  














- Reevaluation(s)


Reevaluation #1: 


I discussed all results and clinical findings with patient.  I discussed plan of

 care with patient.  Patient agrees with plan of care.  Patient is stable for 

discharge.  Patient will be discharged home.  Patient given discharge 

instructions.  Patient voiced understanding of discharge instructions.





Patient states she is feeling better.  Patient states that she essentially pain-

free.  Patient states she is ready to go.  Patient states she needs a refill of 

her regular medications.


03/04/20 01:43








ED Medical Decision Making





- Lab Data


Result diagrams: 


                                 03/03/20 22:48





                                 03/03/20 22:48





- EKG Data


-: EKG Interpreted by Me


EKG shows normal: sinus rhythm, axis, intervals, QRS complexes, ST-T waves


Rate: tachycardia





- Radiology Data


Radiology results: report reviewed, image reviewed








 CHEST 2 VIEWS 





INDICATION / CLINICAL INFORMATION: 


Chest Pain. 





COMPARISON: 


None available. 





FINDINGS: 





SUPPORT DEVICES: None. 


HEART / MEDIASTINUM: No significant abnormality. 


LUNGS / PLEURA: There is mild airspace opacity in the left lung base which could

 represent 


 atelectasis or pneumonia. The remainder the lungs are clear. .No pneumothorax. 





ADDITIONAL FINDINGS: No significant additional findings. 





IMPRESSION: 


1. There is mild airspace opacity in the left base which could represent 

atelectasis or pneumonia. 





- Medical Decision Making





Patient is a 50-year-old female that presents emergency room with complaints of 

chest pain, shortness of breath, cough.  Patient's clinical findings are 

consistent with a left lower lobe pneumonia.  Patient's labs are unremarkable.  

Patient is noted to have a sinus tachycardia on EKG.  Patient given fluids, 

Solu-Medrol and antibiotics.  Patient's curb score is 0.  Patient's pneumonia 

severity index is class II and low risk.  Patient stable to be treated as an 

outpatient.  Patient does not require inpatient treatment.  Patient responded 

well to treatment.  Patient was pain-free prior to discharge.  Patient stated 

her symptoms improved with treatment.  Patient stable for discharge.  Patient 

discharged home.  Patient given antibiotics, Medrol Dosepak and Robitussin-AC.  

Patient given discharge instructions.  Patient given medication instructions.


Critical care attestation.: 


If time is entered above; I have spent that time in minutes in the direct care 

of this critically ill patient, excluding procedure time.








ED Disposition


Clinical Impression: 


 Cough, Acute chest wall pain, Upper back pain, Strain of thoracic back region





Pneumonia


Qualifiers:


 Pneumonia type: due to unspecified organism Laterality: left Lung location: 

lower lobe of lung Qualified Code(s): J18.9 - Pneumonia, unspecified organism





Chest pain


Qualifiers:


 Chest pain type: unspecified Qualified Code(s): R07.9 - Chest pain, unspecified





Disposition: DC-01 TO HOME OR SELFCARE


Is pt being admited?: No


Does the pt Need Aspirin: No


Condition: Stable


Instructions:  Chest Pain (ED), Bacterial Pneumonia (ED)


Additional Instructions: 


Patient to follow-up with primary care in 2 to 3 days.  Patient to rest.  

Patient to increase water.  Patient to avoid strenuous exercise or heavy lifting

 until cleared by PCP.  Patient to take Tylenol as needed for pain.  Patient to 

take meds as directed.  Patient to return to the ER if condition worsens, 

changes or new symptoms arise.


Prescriptions: 


Buspirone HCl [busPIRone] 20 mg PO DAILY 30 Days #30


Depakote 1,500 mg PO HS 30 Days #30


methylPREDNISolone [Medrol 4MG DOSEPAK (21 tabs)] 4 mg PO DAILY 6 Days #1 

tab.ds.pk


guaiFENesin/CODEINE [Robitussin AC] 5 ml PO Q4HR PRN #100 ml


 PRN Reason: Cough


DOXYCYCLINE Hyclate [Vibramycin CAP] 100 mg PO Q12HR 10 Days #20 capsule


Referrals: 


PRIMARY CARE,MD [Primary Care Provider] - 3-5 Days


Time of Disposition: 00:33

## 2020-03-04 VITALS — SYSTOLIC BLOOD PRESSURE: 106 MMHG | DIASTOLIC BLOOD PRESSURE: 78 MMHG

## 2020-03-04 LAB
ANISOCYTOSIS BLD QL SMEAR: (no result)
BAND NEUTROPHILS # (MANUAL): 0.1 K/MM3
MYELOCYTES # (MANUAL): 0 K/MM3
PROMYELOCYTES # (MANUAL): 0 K/MM3
TOTAL CELLS COUNTED BLD: 100

## 2020-03-20 ENCOUNTER — HOSPITAL ENCOUNTER (OUTPATIENT)
Dept: HOSPITAL 5 - MAMMO | Age: 50
Discharge: HOME | End: 2020-03-20
Attending: INTERNAL MEDICINE
Payer: MEDICARE

## 2020-03-20 DIAGNOSIS — R92.8: ICD-10-CM

## 2020-03-20 DIAGNOSIS — N60.02: Primary | ICD-10-CM

## 2020-03-20 NOTE — MAMMOGRAPHY REPORT
LEFT DIGITAL DIAGNOSTIC MAMMOGRAM WITH CAD 3/20/2020

LEFT LIMITED BREAST ULTRASOUND

 

INDICATION: R92.8 LT BREAST



TECHNIQUE:  Digital left mammographic imaging was performed. Spot compression views were obtained. Li
mited ultrasound was performed. This examination was interpreted with the benefit of Computer-Aided D
etection (CAD) analysis. 



COMPARISON: 2/18/2020



FINDINGS: 



Breast Density: The breasts are heterogeneously dense, which may obscure small masses.



MAMMOGRAPHIC FINDINGS: Lateral and spot compression MLO and CC views were performed. An oval circumsc
ribed asymmetry persists on spot compression views. 



ULTRASOUND FINDINGS: Targeted ultrasound evaluation was performed of the area of interest.   Ultrasou
nd the left breast demonstrated an oval anechoic thin-walled cyst at 6:00 2 cm from the nipple measur
ing 6 x 4 x 5 mm. It correlates with the mammographic finding.



IMPRESSION: A benign 6 mm left breast cyst at 6:00. Recommend routine mammographic screening.



Follow up recommendation: Routine yearly



BI-RADS Category 2:  Benign.





A "normal" or negative report should not discourage follow up or biopsy of a clinically significant f
inding.



A written summary of these findings will be mailed to the patient. The patient will be entered into a
 mammography reporting system which will generate a reminder letter for the patient's next appointmen
t at the appropriate interval.



According to the [employer], yearly mammograms are recommended starting at age 40 
and continuing as long as a woman is in good health.  Breast MRI is recommended for women with an alta
roximately 20-25% or greater lifetime risk of breast cancer, including women with a strong family his
tory of breast or ovarian cancer and women who have been treated for Hodgkin's disease.



Signer Name: Jesus Ralph MD 

Signed: 3/20/2020 8:59 AM

Workstation Name: [id number]

## 2021-01-05 NOTE — ULTRASOUND REPORT
LEFT DIGITAL DIAGNOSTIC MAMMOGRAM WITH CAD 3/20/2020

LEFT LIMITED BREAST ULTRASOUND

 

INDICATION: R92.8 LT BREAST



TECHNIQUE:  Digital left mammographic imaging was performed. Spot compression views were obtained. Li
mited ultrasound was performed. This examination was interpreted with the benefit of Computer-Aided D
etection (CAD) analysis. 



COMPARISON: 2/18/2020



FINDINGS: 



Breast Density: The breasts are heterogeneously dense, which may obscure small masses.



MAMMOGRAPHIC FINDINGS: Lateral and spot compression MLO and CC views were performed. An oval circumsc
ribed asymmetry persists on spot compression views. 



ULTRASOUND FINDINGS: Targeted ultrasound evaluation was performed of the area of interest.   Ultrasou
nd the left breast demonstrated an oval anechoic thin-walled cyst at 6:00 2 cm from the nipple measur
ing 6 x 4 x 5 mm. It correlates with the mammographic finding.



IMPRESSION: A benign 6 mm left breast cyst at 6:00. Recommend routine mammographic screening.



Follow up recommendation: Routine yearly



BI-RADS Category 2:  Benign.



Signer Name: Jesus Ralph MD 

Signed: 3/20/2020 8:59 AM

Workstation Name: [id number] Subjective   Chief Complaint   Patient presents with   • Difficulty Urinating      Min Cortes is a 36 y.o. male here today for urinary symptoms.  Patient is having some dysuria with pelvic pressure and urethra discomfort.  He is having some urinary frequency.  He has had UTI before and this felt similar.  No lower back pain or testicular pain.  No history of kidney stones.  No concern for STDs.  No penile discharge.  No fevers.    I have reviewed the following portions of the patient's history and confirmed they are accurate: allergies, current medications, past family history, past medical history, past social history, past surgical history and problem list    I have personally completed the patient's review of systems.    Review of Systems   Constitutional: Negative for activity change, appetite change, chills, diaphoresis, fatigue, fever, unexpected weight gain and unexpected weight loss.   HENT: Negative for ear discharge, ear pain, mouth sores, nosebleeds, sinus pressure, sneezing and sore throat.    Eyes: Negative for pain, discharge and itching.   Respiratory: Negative for cough, chest tightness, shortness of breath and wheezing.    Cardiovascular: Negative for chest pain, palpitations and leg swelling.   Gastrointestinal: Negative for abdominal pain, constipation, diarrhea, nausea and vomiting.   Endocrine: Negative for heat intolerance, polydipsia and polyphagia.   Genitourinary: Positive for dysuria, frequency and urgency. Negative for flank pain and hematuria.        Pelvic pressure   Musculoskeletal: Negative for arthralgias, back pain, gait problem, joint swelling, myalgias, neck pain and neck stiffness.   Skin: Negative for color change, pallor and rash.   Allergic/Immunologic: Negative for immunocompromised state.   Neurological: Negative for seizures, speech difficulty, weakness and numbness.   Hematological: Negative for adenopathy.   Psychiatric/Behavioral: Negative for agitation,  "decreased concentration, sleep disturbance, suicidal ideas and depressed mood. The patient is not nervous/anxious.        No current outpatient medications on file prior to visit.     No current facility-administered medications on file prior to visit.        Objective   Vitals:    01/04/21 1524   BP: 164/100   Pulse: 110   Resp: 16   Temp: 97.1 °F (36.2 °C)   TempSrc: Temporal   SpO2: 98%   Weight: 87.5 kg (193 lb)   Height: 182.9 cm (72\")     Body mass index is 26.18 kg/m².    Physical Exam  Vitals signs reviewed.   Constitutional:       Appearance: Normal appearance. He is well-developed.   HENT:      Head: Normocephalic and atraumatic.      Nose: Nose normal.   Eyes:      General: Lids are normal.      Conjunctiva/sclera: Conjunctivae normal.      Pupils: Pupils are equal, round, and reactive to light.   Neck:      Thyroid: No thyromegaly.      Trachea: Trachea normal.   Pulmonary:      Effort: Pulmonary effort is normal. No respiratory distress.   Skin:     General: Skin is warm and dry.   Neurological:      Mental Status: He is alert and oriented to person, place, and time.      GCS: GCS eye subscore is 4. GCS verbal subscore is 5. GCS motor subscore is 6.   Psychiatric:         Attention and Perception: Attention normal.         Mood and Affect: Mood normal.         Speech: Speech normal.         Behavior: Behavior normal. Behavior is cooperative.         Assessment/Plan   Problem List Items Addressed This Visit     None      Visit Diagnoses     Acute cystitis without hematuria    -  Primary  New onset issue requiring medication management. Will eat well balanced diet and increase water intake. Discussed STD testing if symptoms do not improve.   Start Cipro. Urine culture sent.       Dysuria        Relevant Orders    POCT urinalysis dipstick, automated (Completed)           No current outpatient medications on file.       Plan of care reviewed with the patient at the conclusion of today's visit.  Education " was provided regarding diagnosis, management, and any prescribed or recommended OTC medications.  Patient verbalized understanding of and agreement with management plan.     Return if symptoms worsen or fail to improve.      MARCY La    Please note that portions of this note were completed with a voice recognition program. Efforts were made to edit the dictations, but occasionally words are mistranscribed.

## 2021-04-01 ENCOUNTER — HOSPITAL ENCOUNTER (EMERGENCY)
Dept: HOSPITAL 5 - ED | Age: 51
Discharge: HOME | End: 2021-04-01
Payer: MEDICARE

## 2021-04-01 VITALS — DIASTOLIC BLOOD PRESSURE: 100 MMHG | SYSTOLIC BLOOD PRESSURE: 140 MMHG

## 2021-04-01 DIAGNOSIS — L89.151: Primary | ICD-10-CM

## 2021-04-01 DIAGNOSIS — M19.91: ICD-10-CM

## 2021-04-01 DIAGNOSIS — Z98.890: ICD-10-CM

## 2021-04-01 DIAGNOSIS — I10: ICD-10-CM

## 2021-04-01 DIAGNOSIS — E83.42: ICD-10-CM

## 2021-04-01 DIAGNOSIS — Z79.899: ICD-10-CM

## 2021-04-01 DIAGNOSIS — F31.9: ICD-10-CM

## 2021-04-01 DIAGNOSIS — Z88.8: ICD-10-CM

## 2021-04-01 DIAGNOSIS — Z79.2: ICD-10-CM

## 2021-04-01 DIAGNOSIS — E11.9: ICD-10-CM

## 2021-04-01 DIAGNOSIS — E87.6: ICD-10-CM

## 2021-04-01 LAB
ALBUMIN SERPL-MCNC: 3.2 G/DL (ref 3.9–5)
ALT SERPL-CCNC: 10 UNITS/L (ref 7–56)
APTT BLD: 30 SEC. (ref 24.2–36.6)
BASOPHILS # (AUTO): 0 K/MM3 (ref 0–0.1)
BASOPHILS NFR BLD AUTO: 0.6 % (ref 0–1.8)
BUN SERPL-MCNC: 8 MG/DL (ref 7–17)
BUN/CREAT SERPL: 16 %
CALCIUM SERPL-MCNC: 8.3 MG/DL (ref 8.4–10.2)
EOSINOPHIL # BLD AUTO: 0 K/MM3 (ref 0–0.4)
EOSINOPHIL NFR BLD AUTO: 0.7 % (ref 0–4.3)
HCT VFR BLD CALC: 36.5 % (ref 30.3–42.9)
HEMOLYSIS INDEX: 6
HGB BLD-MCNC: 12.4 GM/DL (ref 10.1–14.3)
INR PPP: 0.95 (ref 0.87–1.13)
LYMPHOCYTES # BLD AUTO: 1.5 K/MM3 (ref 1.2–5.4)
LYMPHOCYTES NFR BLD AUTO: 28.5 % (ref 13.4–35)
MCHC RBC AUTO-ENTMCNC: 34 % (ref 30–34)
MCV RBC AUTO: 99 FL (ref 79–97)
MONOCYTES # (AUTO): 0.8 K/MM3 (ref 0–0.8)
MONOCYTES % (AUTO): 14.4 % (ref 0–7.3)
PLATELET # BLD: 330 K/MM3 (ref 140–440)
RBC # BLD AUTO: 3.68 M/MM3 (ref 3.65–5.03)

## 2021-04-01 PROCEDURE — 83735 ASSAY OF MAGNESIUM: CPT

## 2021-04-01 PROCEDURE — 36415 COLL VENOUS BLD VENIPUNCTURE: CPT

## 2021-04-01 PROCEDURE — 96367 TX/PROPH/DG ADDL SEQ IV INF: CPT

## 2021-04-01 PROCEDURE — 85610 PROTHROMBIN TIME: CPT

## 2021-04-01 PROCEDURE — 85025 COMPLETE CBC W/AUTO DIFF WBC: CPT

## 2021-04-01 PROCEDURE — 80053 COMPREHEN METABOLIC PANEL: CPT

## 2021-04-01 PROCEDURE — 99284 EMERGENCY DEPT VISIT MOD MDM: CPT

## 2021-04-01 PROCEDURE — 85730 THROMBOPLASTIN TIME PARTIAL: CPT

## 2021-04-01 PROCEDURE — 93005 ELECTROCARDIOGRAM TRACING: CPT

## 2021-04-01 PROCEDURE — 96365 THER/PROPH/DIAG IV INF INIT: CPT

## 2021-04-01 PROCEDURE — 84484 ASSAY OF TROPONIN QUANT: CPT

## 2021-04-01 NOTE — EMERGENCY DEPARTMENT REPORT
- General


Chief complaint: Wound/Laceration


Stated complaint: SORE ON BOTTOM


Time Seen by Provider: 04/01/21 16:43


Source: patient


Mode of arrival: Wheelchair


Limitations: No Limitations





- History of Present Illness


Initial comments: 





This is a 51-year-old female nontoxic, well nourished in appearance, no acute 

signs of distress presents to the ED with c/o of pain with slight ulcer to 

coccyx area x several days.  Patient had a recent left knee replacement 3 months

ago and since then stated has been laying on her bed.  Patient otherwise denies 

any trauma or injuries.  Denies any swelling in that area or redness.  Patient 

denies any pus or drainage.  Patient denies any fever, chills, nausea, vomiting,

chest pain, shortness of breath, headache or stiff neck.  Patient stated a

llergies to NSAIDs and oxycodone.


-: days(s)


Location: buttocks


Severity: mild


Severity scale (0 -10): 8


Quality: aching


Consistency: constant


Improves with: none


Worsens with: palpation


Associated symptoms: denies other symptoms


Treatments Prior to Arrival: none





- Related Data


                                Home Medications











 Medication  Instructions  Recorded  Confirmed  Last Taken


 


Buspirone HCl [busPIRone] 15 mg PO BID 12/21/20 12/21/20 Unknown


 


Depakote 250 mg PO BID 12/21/20 12/31/20 12/30/20 20:00


 


Lactose Fast Acting Relief 10 mg PO PRN PRN 12/21/20 12/21/20 Unknown


 


Pediatric Multivitamin No.136 1 each PO DAILY 12/21/20 12/31/20 12/30/20 08:00





[Children Multivitamin]    


 


QUEtiapine [SEROquel] 100 mg PO QHS 12/21/20 12/31/20 12/30/20 20:00


 


amLODIPine [Norvasc] 5 mg PO DAILY 12/21/20 12/31/20 12/30/20 08:00


 


traZODone [Desyrel] 100 mg PO QHS 12/21/20 12/31/20 12/30/20 20:00








                                  Previous Rx's











 Medication  Instructions  Recorded  Last Taken  Type


 


Apixaban [Eliquis] 5 mg PO DAILY #30 tablet 12/31/20 Unknown Rx


 


HYDROmorphone [Dilaudid] 2 mg PO Q6HR #30 tablet 12/31/20 Unknown Rx


 


Clindamycin [Clindamycin CAP] 300 mg PO Q8H #21 cap 04/01/21 Unknown Rx


 


Transparent Dressing [Transparent 1 each TP DAILY 30 Days #1 box 04/01/21 

Unknown Rx





Film Dressing]    











                                    Allergies











Allergy/AdvReac Type Severity Reaction Status Date / Time


 


NSAIDS (Non-Steroidal AdvReac  GI PROBLEMS Verified 12/21/20 16:51





Anti-Inflamma     


 


oxycodone [From Percocet] AdvReac  NOT Verified 12/21/20 16:52





   EFFECTIVE  














Abscess Boil HPI





- HPI


Chief Complaint: Wound/Laceration


Stated Complaint: SORE ON BOTTOM


Time Seen by Provider: 04/01/21 16:43


Home Medications: 


                                Home Medications











 Medication  Instructions  Recorded  Confirmed  Last Taken


 


Buspirone HCl [busPIRone] 15 mg PO BID 12/21/20 12/21/20 Unknown


 


Depakote 250 mg PO BID 12/21/20 12/31/20 12/30/20 20:00


 


Lactose Fast Acting Relief 10 mg PO PRN PRN 12/21/20 12/21/20 Unknown


 


Pediatric Multivitamin No.136 1 each PO DAILY 12/21/20 12/31/20 12/30/20 08:00





[Children Multivitamin]    


 


QUEtiapine [SEROquel] 100 mg PO QHS 12/21/20 12/31/20 12/30/20 20:00


 


amLODIPine [Norvasc] 5 mg PO DAILY 12/21/20 12/31/20 12/30/20 08:00


 


traZODone [Desyrel] 100 mg PO QHS 12/21/20 12/31/20 12/30/20 20:00








                                  Previous Rx's











 Medication  Instructions  Recorded  Last Taken  Type


 


Apixaban [Eliquis] 5 mg PO DAILY #30 tablet 12/31/20 Unknown Rx


 


HYDROmorphone [Dilaudid] 2 mg PO Q6HR #30 tablet 12/31/20 Unknown Rx


 


Clindamycin [Clindamycin CAP] 300 mg PO Q8H #21 cap 04/01/21 Unknown Rx


 


Transparent Dressing [Transparent 1 each TP DAILY 30 Days #1 box 04/01/21 

Unknown Rx





Film Dressing]    











Allergies/Adverse Reactions: 


                                    Allergies











Allergy/AdvReac Type Severity Reaction Status Date / Time


 


NSAIDS (Non-Steroidal AdvReac  GI PROBLEMS Verified 12/21/20 16:51





Anti-Inflamma     


 


oxycodone [From Percocet] AdvReac  NOT Verified 12/21/20 16:52





   EFFECTIVE  














ED Review of Systems


ROS: 


Stated complaint: SORE ON BOTTOM


Other details as noted in HPI





Comment: All other systems reviewed and negative


Constitutional: denies: chills, fever


Eyes: denies: eye pain, eye discharge, vision change


ENT: denies: ear pain, throat pain


Respiratory: denies: cough, shortness of breath, wheezing


Cardiovascular: denies: chest pain, palpitations


Endocrine: no symptoms reported


Gastrointestinal: denies: abdominal pain, nausea, diarrhea


Genitourinary: denies: urgency, dysuria, discharge


Musculoskeletal: denies: back pain, joint swelling, arthralgia


Skin: denies: rash, lesions


Neurological: denies: headache, weakness, paresthesias


Psychiatric: denies: anxiety, depression


Hematological/Lymphatic: denies: easy bleeding, easy bruising





ED Past Medical Hx





- Past Medical History


Hx Hypertension: Yes


Hx Diabetes: Yes


Hx Liver Disease: Yes (Hep C-treated 2010)


Hx Arthritis: Yes


Hx Psychiatric Treatment: Yes (Bipolar)





- Surgical History


Past Surgical History?: Yes


Additional Surgical History: Bariatric Surgery 2015





- Social History


Smoking Status: Unknown if ever smoked


Substance Use Type: None





- Medications


Home Medications: 


                                Home Medications











 Medication  Instructions  Recorded  Confirmed  Last Taken  Type


 


Buspirone HCl [busPIRone] 15 mg PO BID 12/21/20 12/21/20 Unknown History


 


Depakote 250 mg PO BID 12/21/20 12/31/20 12/30/20 20:00 History


 


Lactose Fast Acting Relief 10 mg PO PRN PRN 12/21/20 12/21/20 Unknown History


 


Pediatric Multivitamin No.136 1 each PO DAILY 12/21/20 12/31/20 12/30/20 08:00 

History





[Children Multivitamin]     


 


QUEtiapine [SEROquel] 100 mg PO QHS 12/21/20 12/31/20 12/30/20 20:00 History


 


amLODIPine [Norvasc] 5 mg PO DAILY 12/21/20 12/31/20 12/30/20 08:00 History


 


traZODone [Desyrel] 100 mg PO QHS 12/21/20 12/31/20 12/30/20 20:00 History


 


Apixaban [Eliquis] 5 mg PO DAILY #30 tablet 12/31/20  Unknown Rx


 


HYDROmorphone [Dilaudid] 2 mg PO Q6HR #30 tablet 12/31/20  Unknown Rx


 


Clindamycin [Clindamycin CAP] 300 mg PO Q8H #21 cap 04/01/21  Unknown Rx


 


Transparent Dressing [Transparent 1 each TP DAILY 30 Days #1 box 04/01/21  

Unknown Rx





Film Dressing]     














ED Physical Exam





- General


Limitations: No Limitations


General appearance: alert, in no apparent distress





- Head


Head exam: Present: atraumatic, normocephalic





- Eye


Eye exam: Present: normal appearance





- Neck


Neck exam: Present: normal inspection, full ROM





- Respiratory


Respiratory exam: Present: normal lung sounds bilaterally.  Absent: respiratory 

distress, wheezes, rales, rhonchi, stridor, chest wall tenderness, accessory 

muscle use, decreased breath sounds, prolonged expiratory





- Cardiovascular


Cardiovascular Exam: Present: regular rate, normal rhythm, normal heart sounds. 

Absent: bradycardia, tachycardia, irregular rhythm, systolic murmur, diastolic 

murmur, rubs, gallop





- GI/Abdominal


GI/Abdominal exam: Present: soft, normal bowel sounds.  Absent: distended, 

tenderness, guarding, rebound, rigid, diminished bowel sounds





- Extremities Exam


Extremities exam: Present: full ROM





- Back Exam


Back exam: Present: normal inspection, full ROM, other (stage I decubitus ulcer 

to coccyx area.  No swelling or cellulitis noted.  No abscess.  No pus or 

drainage.).  Absent: tenderness, CVA tenderness (R), CVA tenderness (L), muscle 

spasm, paraspinal tenderness, vertebral tenderness, rash noted





- Neurological Exam


Neurological exam: Present: alert, oriented X3





- Psychiatric


Psychiatric exam: Present: normal affect, normal mood





- Skin


Skin exam: Present: warm, dry, intact, normal color.  Absent: rash





ED Course


                                   Vital Signs











  04/01/21





  14:51


 


Temperature 98.6 F


 


Pulse Rate 87


 


Respiratory 18





Rate 


 


Blood Pressure 122/86


 


O2 Sat by Pulse 100





Oximetry 














- Reevaluation(s)


Reevaluation #1: 





04/01/21 17:32


Patient is speaking in full sentences with no signs of distress noted.





- Consultations


Consultation #1: 





04/01/21 17:32


Patient has been consulted with Dr. Colin about patient history, physical exam, 

and labs and patient can get 20 IV K+ and 40 PO K+ and discharged with follow-up

and no repeat labs necessary.





ED Medical Decision Making





- Lab Data


Result diagrams: 


                                 04/01/21 16:03





                                 04/01/21 16:03








                                   Lab Results











  04/01/21 04/01/21 04/01/21 Range/Units





  16:03 16:03 17:01 


 


WBC  5.4    (4.5-11.0)  K/mm3


 


RBC  3.68    (3.65-5.03)  M/mm3


 


Hgb  12.4    (10.1-14.3)  gm/dl


 


Hct  36.5    (30.3-42.9)  %


 


MCV  99 H    (79-97)  fl


 


MCH  34 H    (28-32)  pg


 


MCHC  34    (30-34)  %


 


RDW  14.8    (13.2-15.2)  %


 


Plt Count  330    (140-440)  K/mm3


 


Lymph % (Auto)  28.5    (13.4-35.0)  %


 


Mono % (Auto)  14.4 H    (0.0-7.3)  %


 


Eos % (Auto)  0.7    (0.0-4.3)  %


 


Baso % (Auto)  0.6    (0.0-1.8)  %


 


Lymph # (Auto)  1.5    (1.2-5.4)  K/mm3


 


Mono # (Auto)  0.8    (0.0-0.8)  K/mm3


 


Eos # (Auto)  0.0    (0.0-0.4)  K/mm3


 


Baso # (Auto)  0.0    (0.0-0.1)  K/mm3


 


Seg Neutrophils %  55.8    (40.0-70.0)  %


 


Seg Neutrophils #  3.0    (1.8-7.7)  K/mm3


 


PT    12.6  (12.2-14.9)  Sec.


 


INR    0.95  (0.87-1.13)  


 


APTT    30.0  (24.2-36.6)  Sec.


 


Sodium   136 L   (137-145)  mmol/L


 


Potassium   2.8 L*   (3.6-5.0)  mmol/L


 


Chloride   97.5 L   ()  mmol/L


 


Carbon Dioxide   30   (22-30)  mmol/L


 


Anion Gap   11   mmol/L


 


BUN   8   (7-17)  mg/dL


 


Creatinine   0.5 L   (0.6-1.2)  mg/dL


 


Estimated GFR   > 60   ml/min


 


BUN/Creatinine Ratio   16   %


 


Glucose   111 H   ()  mg/dL


 


Calcium   8.3 L   (8.4-10.2)  mg/dL


 


Magnesium     (1.7-2.3)  mg/dL


 


Total Bilirubin   0.50   (0.1-1.2)  mg/dL


 


AST   29   (5-40)  units/L


 


ALT   10   (7-56)  units/L


 


Alkaline Phosphatase   95   ()  units/L


 


Troponin T     (0.00-0.029)  ng/mL


 


Total Protein   6.3   (6.3-8.2)  g/dL


 


Albumin   3.2 L   (3.9-5)  g/dL


 


Albumin/Globulin Ratio   1.0   %














  04/01/21 Range/Units





  17:01 


 


WBC   (4.5-11.0)  K/mm3


 


RBC   (3.65-5.03)  M/mm3


 


Hgb   (10.1-14.3)  gm/dl


 


Hct   (30.3-42.9)  %


 


MCV   (79-97)  fl


 


MCH   (28-32)  pg


 


MCHC   (30-34)  %


 


RDW   (13.2-15.2)  %


 


Plt Count   (140-440)  K/mm3


 


Lymph % (Auto)   (13.4-35.0)  %


 


Mono % (Auto)   (0.0-7.3)  %


 


Eos % (Auto)   (0.0-4.3)  %


 


Baso % (Auto)   (0.0-1.8)  %


 


Lymph # (Auto)   (1.2-5.4)  K/mm3


 


Mono # (Auto)   (0.0-0.8)  K/mm3


 


Eos # (Auto)   (0.0-0.4)  K/mm3


 


Baso # (Auto)   (0.0-0.1)  K/mm3


 


Seg Neutrophils %   (40.0-70.0)  %


 


Seg Neutrophils #   (1.8-7.7)  K/mm3


 


PT   (12.2-14.9)  Sec.


 


INR   (0.87-1.13)  


 


APTT   (24.2-36.6)  Sec.


 


Sodium   (137-145)  mmol/L


 


Potassium   (3.6-5.0)  mmol/L


 


Chloride   ()  mmol/L


 


Carbon Dioxide   (22-30)  mmol/L


 


Anion Gap   mmol/L


 


BUN   (7-17)  mg/dL


 


Creatinine   (0.6-1.2)  mg/dL


 


Estimated GFR   ml/min


 


BUN/Creatinine Ratio   %


 


Glucose   ()  mg/dL


 


Calcium   (8.4-10.2)  mg/dL


 


Magnesium  1.20 L  (1.7-2.3)  mg/dL


 


Total Bilirubin   (0.1-1.2)  mg/dL


 


AST   (5-40)  units/L


 


ALT   (7-56)  units/L


 


Alkaline Phosphatase   ()  units/L


 


Troponin T  < 0.010  (0.00-0.029)  ng/mL


 


Total Protein   (6.3-8.2)  g/dL


 


Albumin   (3.9-5)  g/dL


 


Albumin/Globulin Ratio   %














- EKG Data





04/01/21 18:57


Normal sinus rhythm at 91 bpm.


No significant ST or T wave abnormalities.


Reviewed and signed by MD.





- Medical Decision Making





This is a 51-year-old female that presents with decubitus ulcer stage I and 

hypokalemia.  Patient is stable and was examined by me.  Patient received 

potassium p.o. and IV.  Patient be treated with clindamycin and transparent film

placed to decubitus ulcer.  There is no induration, fluctuance.  No signs of 

abscess formation.  Patient educated on decubitus ulcer and ways to prevent it. 

Patient was referred to Follow-up with a primary care doctor in 3-5 days or if 

symptoms worsen and continue return to emergency room as soon as possible.  At 

time of discharge, the patient does not seem toxic or ill in appearance.  No 

acute signs of distress noted.  Patient agrees to discharge treatment plan of 

care.  No further questions noted by the patient.


Critical care attestation.: 


If time is entered above; I have spent that time in minutes in the direct care 

of this critically ill patient, excluding procedure time.








ED Disposition


Clinical Impression: 


 Hypokalemia, Hypomagnesemia





Decubitus ulcer, stage 1


Qualifiers:


 Pressure injury location: sacral region Qualified Code(s): L89.151 - Pressure 

ulcer of sacral region, stage 1





Disposition: DC-01 TO HOME OR SELFCARE


Is pt being admited?: No


Does the pt Need Aspirin: No


Condition: Stable


Instructions:  Hypomagnesemia, Hypokalemia, Preventing Pressure Injuries, 

Pressure Injury, Potassium Content of Foods


Additional Instructions: 


Follow-up with a primary care doctor in 3-5 days or if symptoms worsen and 

continue return to emergency room as soon as possible. 


Prescriptions: 


Clindamycin [Clindamycin CAP] 300 mg PO Q8H #21 cap


Transparent Dressing [Transparent Film Dressing] 1 each TP DAILY 30 Days #1 box


Referrals: 


PRIMARY CARE,MD [Primary Care Provider] - 3-5 Days


DARION EMANUEL MD [Staff Physician] - 3-5 Days


Time of Disposition: 18:57

## 2021-04-01 NOTE — EVENT NOTE
ED Screening Note


ED Screening Note: 





pilonial cyst on exam








This initial assessment/diagnostic orders/clinical plan/treatment(s) is/are 

subject to change based on patients health status, clinical progression and re-

assessment by fellow clinical providers in the ED. Further treatment and workup 

at subsequent clinical providers discretion. Patient/guardian urged not to elope

from the ED as their condition may be serious if not clinically assessed and 

managed. 





Initial orders include: 


labs


I/D

## 2021-04-02 NOTE — ELECTROCARDIOGRAPH REPORT
[facility]

                                       

Test Date:    2021               Test Time:    18:51:42

Pat Name:     JOSE LONDONO         Department:   

Patient ID:   [id number]          Room:          

Gender:       F                        Technician:   EHSAN

:          1970               Requested By: JESSY BERNSTEIN

Order Number: [id number]              Reading MD:   Jose A Mcmanus

                                 Measurements

Intervals                              Axis          

Rate:         91                       P:            94

NE:           156                      QRS:          56

QRSD:         75                       T:            49

QT:           357                                    

QTc:          439                                    

                           Interpretive Statements

Sinus rhythm

Probable left atrial enlargement

No previous ECG available for comparison

Electronically Signed On 2021 14:43:41 EDT by Jose A Mcmanus

## 2021-12-23 ENCOUNTER — HOSPITAL ENCOUNTER (EMERGENCY)
Dept: HOSPITAL 5 - ED | Age: 51
Discharge: HOME | End: 2021-12-23
Payer: MEDICARE

## 2021-12-23 VITALS — DIASTOLIC BLOOD PRESSURE: 103 MMHG | SYSTOLIC BLOOD PRESSURE: 139 MMHG

## 2021-12-23 DIAGNOSIS — F17.200: ICD-10-CM

## 2021-12-23 DIAGNOSIS — F31.9: ICD-10-CM

## 2021-12-23 DIAGNOSIS — Z88.6: ICD-10-CM

## 2021-12-23 DIAGNOSIS — I10: ICD-10-CM

## 2021-12-23 DIAGNOSIS — B34.9: Primary | ICD-10-CM

## 2021-12-23 PROCEDURE — 87400 INFLUENZA A/B EACH AG IA: CPT

## 2021-12-23 PROCEDURE — 99283 EMERGENCY DEPT VISIT LOW MDM: CPT

## 2021-12-23 NOTE — EMERGENCY DEPARTMENT REPORT
- General


Chief Complaint: Upper Respiratory Infection


Stated Complaint: HEADACHE/BODY ACHE/FATIGUE


Time Seen by Provider: 12/23/21 10:33


Source: patient


Mode of arrival: Ambulatory


Limitations: No Limitations





- History of Present Illness


Initial Comments: 





51-year-old female with a past medical history of hypertension, tobacco use and 

bipolar disorder presents to the ER today with complaints of flulike/URI 

symptoms.  Patient states that her symptoms started yesterday.  She reports 

headache, body aches, pressure but her sinuses and behind her eyes.  She reports

rhinorrhea, and mild sore throat as well as nausea and decreased appetite.  She 

states her symptoms started yesterday.  She states that she has been around her 

 and kids who had similar symptoms.  She states her  know her kids

were tested for the flu not Covid.  She states that he did take the first dose 

of Pfizer vaccine about 2 weeks ago.  He reports no chest pain, shortness of 

breath, wheezing, abdominal pain or any additional symptoms at this time.


MD Complaint: cough, rhinorrhea, other (Denies body aches, sinus pain,)


-: days(s) (1)





- Related Data


                                  Previous Rx's











 Medication  Instructions  Recorded  Last Taken  Type


 


Meloxicam [Mobic] 7.5 mg PO QDAY PRN #7 tablet 09/28/19 Unknown Rx


 


Acetaminophen/Codeine [Tylenol 1 tab PO Q6H PRN #7 tab 12/23/21 Unknown Rx





/Codeine # 3 tab]    


 


Ondansetron [Zofran Odt] 4 mg PO Q8HR #15 tab.rapdis 12/23/21 Unknown Rx











                                    Allergies











Allergy/AdvReac Type Severity Reaction Status Date / Time


 


NSAIDS (Non-Steroidal Allergy  Unknown Verified 09/28/19 02:33





Anti-Inflamma     














ED Review of Systems


ROS: 


Stated complaint: HEADACHE/BODY ACHE/FATIGUE


Other details as noted in HPI





Comment: All other systems reviewed and negative


Constitutional: denies: chills, fever


Eyes: denies: eye pain, eye discharge, vision change


ENT: other (Rhinorrhea).  denies: ear pain, throat pain, dental pain, hearing 

loss, epistaxis


Respiratory: cough (Mild).  denies: shortness of breath, SOB with exertion, SOB 

at rest, wheezing


Cardiovascular: denies: chest pain


Gastrointestinal: nausea.  denies: melena


Genitourinary: denies: urgency, dysuria, hematuria, discharge, abnormal menses, 

dyspareunia


Musculoskeletal: myalgia.  denies: back pain, joint swelling, arthralgia


Skin: denies: rash, lesions


Neurological: headache.  denies: numbness, paresthesias, confusion, abnormal 

gait, vertigo, other


Psychiatric: denies: anxiety, depression, auditory hallucinations, visual 

hallucinations, homicidal thoughts, suicidal thoughts


Hematological/Lymphatic: denies: easy bleeding, easy bruising, swollen glands





ED Past Medical Hx





- Surgical History


Additional Surgical History: barriatric X 4 years





- Social History


Smoking Status: Current Every Day Smoker


Substance Use Type: None





- Medications


Home Medications: 


                                Home Medications











 Medication  Instructions  Recorded  Confirmed  Last Taken  Type


 


Meloxicam [Mobic] 7.5 mg PO QDAY PRN #7 tablet 09/28/19  Unknown Rx


 


Acetaminophen/Codeine [Tylenol 1 tab PO Q6H PRN #7 tab 12/23/21  Unknown Rx





/Codeine # 3 tab]     


 


Ondansetron [Zofran Odt] 4 mg PO Q8HR #15 tab.rapdis 12/23/21  Unknown Rx














ED Physical Exam





- General


Limitations: No Limitations


General appearance: alert, in no apparent distress





- Head


Head exam: Present: atraumatic, normocephalic, normal inspection





- Eye


Eye exam: Present: normal appearance, PERRL, EOMI


Pupils: Present: normal accommodation





- ENT


ENT exam: Present: normal exam, mucous membranes moist





- Neck


Neck exam: Present: normal inspection, full ROM.  Absent: meningismus





- Respiratory


Respiratory exam: Present: normal lung sounds bilaterally.  Absent: respiratory 

distress, wheezes, rales, rhonchi, stridor





- Cardiovascular


Cardiovascular Exam: Present: regular rate, normal rhythm, normal heart sounds





- Neurological Exam


Neurological exam: Present: alert, oriented X3, CN II-XII intact, normal gait





- Psychiatric


Psychiatric exam: Present: normal affect, normal mood





- Skin


Skin exam: Present: intact





ED Course


                                   Vital Signs











  12/23/21 12/23/21





  09:25 11:58


 


Temperature 98.0 F 97.7 F


 


Pulse Rate 71 82


 


Respiratory 14 16





Rate  


 


Blood Pressure 162/100 139/103





[Left]  


 


O2 Sat by Pulse 100 100





Oximetry  














ED Medical Decision Making





- Medical Decision Making


Rapid flu negative.  The patient is  resting comfortably, is alert and in no 

distress and on her phone.  The patient has normal mental status and is 

neurologically intact.  The patient appears well  and there is no significant 

dehydration.  There is no respiratory distress and no signs of systemic 

toxicity.  The history, exam,  and current condition do not demonstrate an 

infectious process such as meningitis, severe pneumonia, retropharyngeal 

abscess, acute respiratory distress syndrome with hypoxia, sepsis or other 

serious viral/bacterial infection requiring further testing, treatment, 

consultation or admission at this time.  Blood pressure was noted to be 

elevated, but she admitted that she had not taken her blood pressure medications

 today.  Patient has no symptoms related to her high blood pressure.  Remainder 

of her vital signs are stable.  Discussed results with patient.  I did encourage

 that she should get a COVID-19 test despite her getting a Covid vaccine 2 weeks

 ago to rule out that possibility.  Patient expressed understanding agree with 

plan.  The patient's condition is stable and appropriate for discharge.  The 

patient will pursue further outpatient evaluation with the primary care 

physician.


Critical care attestation.: 


If time is entered above; I have spent that time in minutes in the direct care 

of this critically ill patient, excluding procedure time.








ED Disposition


Clinical Impression: 


 Viral illness





Disposition: 01 HOME / SELF CARE / HOMELESS


Is pt being admited?: No


Does the pt Need Aspirin: No


Condition: Stable


Instructions:  Viral Illness, Adult


Additional Instructions: 


Rapid flu test today was negative.  Your symptoms could be related to you just 

receiving the Covid  vaccine but also a nonspecific viral illness, but I do 

recommend that she get a COVID-19 test once you leave here today at any local 

pharmacy or urgent care. In meantime, take the tylenol 3 for pain and or fever. 

 Do not take regular Tylenol if you take this medication.  Take the Zofran to 

help with any nausea.  Recommend drinking lots of fluids.  Recommend that you 

quarantine until you know the results of your test.  Follow-up closely with your

 PCP.  Return to the ER if your symptoms worsens.


Prescriptions: 


Acetaminophen/Codeine [Tylenol /Codeine # 3 tab] 1 tab PO Q6H PRN #7 tab


 PRN Reason: Pain , Severe (7-10)


Ondansetron [Zofran Odt] 4 mg PO Q8HR #15 tab.rapdis


Referrals: 


JONN CHANCE MD [Primary Care Provider] - 3-5 Days


Forms:  Work/School Release Form(ED)


Time of Disposition: 11:49

## 2022-01-05 ENCOUNTER — HOSPITAL ENCOUNTER (OUTPATIENT)
Dept: HOSPITAL 5 - XRAY | Age: 52
Discharge: HOME | End: 2022-01-05
Attending: INTERNAL MEDICINE
Payer: MEDICARE

## 2022-01-05 DIAGNOSIS — R07.9: Primary | ICD-10-CM

## 2022-01-05 PROCEDURE — 71046 X-RAY EXAM CHEST 2 VIEWS: CPT

## 2022-01-05 NOTE — XRAY REPORT
CHEST 2 VIEWS 



INDICATION:  CHEST PRESSURE.



COMPARISON:  3/3/2020.



FINDINGS:

Support devices: None.



Heart: Within normal limits. 

Lungs/pleura: No acute air space or interstitial disease.  Left lower lung opacity has resolved since
 3/3/2020. No pleural effusion or pneumothorax.



Additional findings: None.



IMPRESSION:

No acute findings.



Signer Name: Oliver Aponte Jr, MD 

Signed: 1/5/2022 3:30 PM

Workstation Name: [id number]

## 2022-05-27 ENCOUNTER — HOSPITAL ENCOUNTER (EMERGENCY)
Dept: HOSPITAL 5 - ED | Age: 52
Discharge: LEFT BEFORE DISCHARGE | End: 2022-05-27
Payer: MEDICARE

## 2022-05-27 DIAGNOSIS — L98.499: Primary | ICD-10-CM

## 2022-05-27 DIAGNOSIS — Z53.21: ICD-10-CM

## 2022-05-28 ENCOUNTER — HOSPITAL ENCOUNTER (EMERGENCY)
Dept: HOSPITAL 5 - ED | Age: 52
LOS: 1 days | End: 2022-05-29
Payer: MEDICARE

## 2022-05-28 VITALS — SYSTOLIC BLOOD PRESSURE: 155 MMHG | DIASTOLIC BLOOD PRESSURE: 102 MMHG

## 2022-05-28 DIAGNOSIS — Z53.21: ICD-10-CM

## 2022-05-28 DIAGNOSIS — R10.9: Primary | ICD-10-CM

## 2022-05-28 LAB
ALBUMIN SERPL-MCNC: 4.1 G/DL (ref 3.9–5)
ALT SERPL-CCNC: 11 UNITS/L (ref 7–56)
ANISOCYTOSIS BLD QL SMEAR: (no result)
BAND NEUTROPHILS # (MANUAL): 0 K/MM3
BILIRUB UR QL STRIP: (no result)
BLOOD UR QL VISUAL: (no result)
BUN SERPL-MCNC: 4 MG/DL (ref 7–17)
BUN/CREAT SERPL: 8 %
CALCIUM SERPL-MCNC: 9.6 MG/DL (ref 8.4–10.2)
HCT VFR BLD CALC: 40.6 % (ref 30.3–42.9)
HEMOLYSIS INDEX: 8
HGB BLD-MCNC: 13.9 GM/DL (ref 10.1–14.3)
MCHC RBC AUTO-ENTMCNC: 34 % (ref 30–34)
MCV RBC AUTO: 98 FL (ref 79–97)
MYELOCYTES # (MANUAL): 0 K/MM3
PH UR STRIP: 9 [PH] (ref 5–7)
PLATELET # BLD: 332 K/MM3 (ref 140–440)
PROMYELOCYTES # (MANUAL): 0 K/MM3
PROT UR STRIP-MCNC: (no result) MG/DL
RBC # BLD AUTO: 4.13 M/MM3 (ref 3.65–5.03)
RBC #/AREA URNS HPF: 2 /HPF (ref 0–6)
TOTAL CELLS COUNTED BLD: 100
UROBILINOGEN UR-MCNC: 4 MG/DL (ref ?–2)
WBC #/AREA URNS HPF: < 1 /HPF (ref 0–6)

## 2022-05-28 PROCEDURE — 85025 COMPLETE CBC W/AUTO DIFF WBC: CPT

## 2022-05-28 PROCEDURE — 80053 COMPREHEN METABOLIC PANEL: CPT

## 2022-05-28 PROCEDURE — 83690 ASSAY OF LIPASE: CPT

## 2022-05-28 PROCEDURE — 36415 COLL VENOUS BLD VENIPUNCTURE: CPT

## 2022-05-28 PROCEDURE — 85007 BL SMEAR W/DIFF WBC COUNT: CPT

## 2022-05-28 PROCEDURE — 81001 URINALYSIS AUTO W/SCOPE: CPT

## 2022-05-28 PROCEDURE — 82150 ASSAY OF AMYLASE: CPT

## 2023-09-11 ENCOUNTER — CLAIMS CREATED FROM THE CLAIM WINDOW (OUTPATIENT)
Dept: URBAN - METROPOLITAN AREA SURGERY CENTER 7 | Facility: SURGERY CENTER | Age: 53
End: 2023-09-11
Payer: MEDICARE

## 2023-09-11 ENCOUNTER — WEB ENCOUNTER (OUTPATIENT)
Dept: URBAN - METROPOLITAN AREA CLINIC 27 | Facility: CLINIC | Age: 53
End: 2023-09-11

## 2023-09-11 DIAGNOSIS — K64.0 INTERNAL HEMORRHOIDS GRADE I: ICD-10-CM

## 2023-09-11 DIAGNOSIS — Z12.11 COLON CANCER SCREENING: ICD-10-CM

## 2023-09-11 DIAGNOSIS — Z80.0 FAMILY HISTORY OF COLON CANCER: ICD-10-CM

## 2023-09-11 DIAGNOSIS — Z12.11 COLON CANCER SCREENING (HIGH RISK): ICD-10-CM

## 2023-09-11 DIAGNOSIS — Z80.0 BROTHER AT YOUNG AGE FAMILY HISTORY OF COLON CANCER: ICD-10-CM

## 2023-09-11 PROCEDURE — G8907 PT DOC NO EVENTS ON DISCHARG: HCPCS | Performed by: INTERNAL MEDICINE

## 2023-09-11 PROCEDURE — G0105 COLORECTAL SCRN; HI RISK IND: HCPCS | Performed by: INTERNAL MEDICINE

## 2023-09-11 PROCEDURE — 00811 ANES LWR INTST NDSC NOS: CPT | Performed by: NURSE ANESTHETIST, CERTIFIED REGISTERED
